# Patient Record
Sex: MALE | Race: WHITE | Employment: OTHER | URBAN - NONMETROPOLITAN AREA
[De-identification: names, ages, dates, MRNs, and addresses within clinical notes are randomized per-mention and may not be internally consistent; named-entity substitution may affect disease eponyms.]

---

## 2019-05-03 RX ORDER — SPIRONOLACTONE 25 MG/1
25 TABLET ORAL DAILY
Status: ON HOLD | COMMUNITY
End: 2022-02-10 | Stop reason: ALTCHOICE

## 2019-05-03 RX ORDER — CLOPIDOGREL BISULFATE 75 MG/1
75 TABLET ORAL DAILY
COMMUNITY

## 2019-05-03 RX ORDER — METOPROLOL TARTRATE 50 MG/1
50 TABLET, FILM COATED ORAL DAILY
COMMUNITY

## 2019-05-03 RX ORDER — ASPIRIN 325 MG
325 TABLET ORAL DAILY
COMMUNITY

## 2019-05-09 ENCOUNTER — HOSPITAL ENCOUNTER (OUTPATIENT)
Dept: INPATIENT UNIT | Age: 74
Discharge: HOME | End: 2019-05-10
Attending: INTERNAL MEDICINE | Admitting: INTERNAL MEDICINE
Payer: MEDICARE

## 2019-05-09 PROBLEM — Z95.820 STATUS POST ANGIOPLASTY WITH STENT: Status: ACTIVE | Noted: 2019-05-09

## 2019-05-09 LAB
ABO: NORMAL
ALBUMIN SERPL-MCNC: 4.6 G/DL (ref 3.5–5.1)
ALP BLD-CCNC: 74 U/L (ref 38–126)
ALT SERPL-CCNC: 16 U/L (ref 11–66)
ANION GAP SERPL CALCULATED.3IONS-SCNC: 12 MEQ/L (ref 8–16)
ANTIBODY SCREEN: NORMAL
AST SERPL-CCNC: 16 U/L (ref 5–40)
BILIRUB SERPL-MCNC: 0.6 MG/DL (ref 0.3–1.2)
BUN BLDV-MCNC: 22 MG/DL (ref 7–22)
CALCIUM SERPL-MCNC: 9.7 MG/DL (ref 8.5–10.5)
CHLORIDE BLD-SCNC: 100 MEQ/L (ref 98–111)
CHOLESTEROL, TOTAL: 125 MG/DL (ref 100–199)
CO2: 26 MEQ/L (ref 23–33)
CREAT SERPL-MCNC: 1.1 MG/DL (ref 0.4–1.2)
EKG ATRIAL RATE: 64 BPM
EKG P AXIS: 65 DEGREES
EKG P-R INTERVAL: 160 MS
EKG Q-T INTERVAL: 408 MS
EKG QRS DURATION: 90 MS
EKG QTC CALCULATION (BAZETT): 420 MS
EKG R AXIS: 35 DEGREES
EKG T AXIS: 54 DEGREES
EKG VENTRICULAR RATE: 64 BPM
ERYTHROCYTE [DISTWIDTH] IN BLOOD BY AUTOMATED COUNT: 12.1 % (ref 11.5–14.5)
ERYTHROCYTE [DISTWIDTH] IN BLOOD BY AUTOMATED COUNT: 38.7 FL (ref 35–45)
GFR SERPL CREATININE-BSD FRML MDRD: 66 ML/MIN/1.73M2
GLUCOSE BLD-MCNC: 169 MG/DL (ref 70–108)
HCT VFR BLD CALC: 40.4 % (ref 42–52)
HDLC SERPL-MCNC: 40 MG/DL
HEMOGLOBIN: 13.9 GM/DL (ref 14–18)
INR BLD: 1.02 (ref 0.85–1.13)
LDL CHOLESTEROL CALCULATED: 67 MG/DL
MCH RBC QN AUTO: 30.6 PG (ref 26–33)
MCHC RBC AUTO-ENTMCNC: 34.4 GM/DL (ref 32.2–35.5)
MCV RBC AUTO: 89 FL (ref 80–94)
PLATELET # BLD: 193 THOU/MM3 (ref 130–400)
PMV BLD AUTO: 11.2 FL (ref 9.4–12.4)
POTASSIUM REFLEX MAGNESIUM: 4.7 MEQ/L (ref 3.5–5.2)
RBC # BLD: 4.54 MILL/MM3 (ref 4.7–6.1)
RH FACTOR: NORMAL
SODIUM BLD-SCNC: 138 MEQ/L (ref 135–145)
TOTAL PROTEIN: 7.3 G/DL (ref 6.1–8)
TRIGL SERPL-MCNC: 88 MG/DL (ref 0–199)
WBC # BLD: 8.4 THOU/MM3 (ref 4.8–10.8)

## 2019-05-09 PROCEDURE — C1894 INTRO/SHEATH, NON-LASER: HCPCS

## 2019-05-09 PROCEDURE — 93005 ELECTROCARDIOGRAM TRACING: CPT | Performed by: INTERNAL MEDICINE

## 2019-05-09 PROCEDURE — 86850 RBC ANTIBODY SCREEN: CPT

## 2019-05-09 PROCEDURE — 80061 LIPID PANEL: CPT

## 2019-05-09 PROCEDURE — C1874 STENT, COATED/COV W/DEL SYS: HCPCS

## 2019-05-09 PROCEDURE — 2500000003 HC RX 250 WO HCPCS

## 2019-05-09 PROCEDURE — 2709999900 HC NON-CHARGEABLE SUPPLY

## 2019-05-09 PROCEDURE — 93458 L HRT ARTERY/VENTRICLE ANGIO: CPT | Performed by: INTERNAL MEDICINE

## 2019-05-09 PROCEDURE — 86900 BLOOD TYPING SEROLOGIC ABO: CPT

## 2019-05-09 PROCEDURE — 85027 COMPLETE CBC AUTOMATED: CPT

## 2019-05-09 PROCEDURE — 2580000003 HC RX 258: Performed by: INTERNAL MEDICINE

## 2019-05-09 PROCEDURE — C1760 CLOSURE DEV, VASC: HCPCS

## 2019-05-09 PROCEDURE — 86901 BLOOD TYPING SEROLOGIC RH(D): CPT

## 2019-05-09 PROCEDURE — 36415 COLL VENOUS BLD VENIPUNCTURE: CPT

## 2019-05-09 PROCEDURE — 93010 ELECTROCARDIOGRAM REPORT: CPT | Performed by: NUCLEAR MEDICINE

## 2019-05-09 PROCEDURE — 6360000002 HC RX W HCPCS

## 2019-05-09 PROCEDURE — 80053 COMPREHEN METABOLIC PANEL: CPT

## 2019-05-09 PROCEDURE — 85610 PROTHROMBIN TIME: CPT

## 2019-05-09 PROCEDURE — 92929 HC PRQ CARD STENT W/ANGIO ADDL: CPT | Performed by: INTERNAL MEDICINE

## 2019-05-09 PROCEDURE — C1769 GUIDE WIRE: HCPCS

## 2019-05-09 PROCEDURE — 6360000004 HC RX CONTRAST MEDICATION: Performed by: INTERNAL MEDICINE

## 2019-05-09 PROCEDURE — C1725 CATH, TRANSLUMIN NON-LASER: HCPCS

## 2019-05-09 PROCEDURE — 92928 PRQ TCAT PLMT NTRAC ST 1 LES: CPT | Performed by: INTERNAL MEDICINE

## 2019-05-09 RX ORDER — SODIUM CHLORIDE 0.9 % (FLUSH) 0.9 %
10 SYRINGE (ML) INJECTION PRN
Status: DISCONTINUED | OUTPATIENT
Start: 2019-05-09 | End: 2019-05-10 | Stop reason: HOSPADM

## 2019-05-09 RX ORDER — OLMESARTAN MEDOXOMIL AND HYDROCHLOROTHIAZIDE 20/12.5 20; 12.5 MG/1; MG/1
1 TABLET ORAL NIGHTLY
Status: DISCONTINUED | OUTPATIENT
Start: 2019-05-09 | End: 2019-05-10 | Stop reason: HOSPADM

## 2019-05-09 RX ORDER — SODIUM CHLORIDE 0.9 % (FLUSH) 0.9 %
10 SYRINGE (ML) INJECTION EVERY 12 HOURS SCHEDULED
Status: DISCONTINUED | OUTPATIENT
Start: 2019-05-09 | End: 2019-05-10 | Stop reason: HOSPADM

## 2019-05-09 RX ORDER — ACETAMINOPHEN 325 MG/1
650 TABLET ORAL EVERY 4 HOURS PRN
Status: DISCONTINUED | OUTPATIENT
Start: 2019-05-09 | End: 2019-05-10 | Stop reason: HOSPADM

## 2019-05-09 RX ORDER — ONDANSETRON 2 MG/ML
4 INJECTION INTRAMUSCULAR; INTRAVENOUS EVERY 6 HOURS PRN
Status: DISCONTINUED | OUTPATIENT
Start: 2019-05-09 | End: 2019-05-10 | Stop reason: HOSPADM

## 2019-05-09 RX ORDER — SODIUM CHLORIDE 0.9 % (FLUSH) 0.9 %
10 SYRINGE (ML) INJECTION EVERY 12 HOURS SCHEDULED
Status: DISCONTINUED | OUTPATIENT
Start: 2019-05-09 | End: 2019-05-09 | Stop reason: SDUPTHER

## 2019-05-09 RX ORDER — CLOPIDOGREL BISULFATE 75 MG/1
75 TABLET ORAL DAILY
Status: DISCONTINUED | OUTPATIENT
Start: 2019-05-10 | End: 2019-05-10 | Stop reason: HOSPADM

## 2019-05-09 RX ORDER — SIMVASTATIN 40 MG
40 TABLET ORAL NIGHTLY
Status: DISCONTINUED | OUTPATIENT
Start: 2019-05-09 | End: 2019-05-10 | Stop reason: HOSPADM

## 2019-05-09 RX ORDER — ASPIRIN 325 MG
325 TABLET ORAL DAILY
Status: DISCONTINUED | OUTPATIENT
Start: 2019-05-10 | End: 2019-05-09 | Stop reason: SDUPTHER

## 2019-05-09 RX ORDER — METOPROLOL TARTRATE 50 MG/1
50 TABLET, FILM COATED ORAL DAILY
Status: DISCONTINUED | OUTPATIENT
Start: 2019-05-09 | End: 2019-05-10 | Stop reason: HOSPADM

## 2019-05-09 RX ORDER — ASPIRIN 325 MG
325 TABLET ORAL DAILY
Status: DISCONTINUED | OUTPATIENT
Start: 2019-05-10 | End: 2019-05-10 | Stop reason: HOSPADM

## 2019-05-09 RX ORDER — DIPHENHYDRAMINE HCL 25 MG
50 TABLET ORAL ONCE
Status: DISCONTINUED | OUTPATIENT
Start: 2019-05-09 | End: 2019-05-10 | Stop reason: HOSPADM

## 2019-05-09 RX ORDER — SODIUM CHLORIDE 0.9 % (FLUSH) 0.9 %
10 SYRINGE (ML) INJECTION PRN
Status: DISCONTINUED | OUTPATIENT
Start: 2019-05-09 | End: 2019-05-09 | Stop reason: SDUPTHER

## 2019-05-09 RX ORDER — ATROPINE SULFATE 0.4 MG/ML
0.5 AMPUL (ML) INJECTION
Status: ACTIVE | OUTPATIENT
Start: 2019-05-09 | End: 2019-05-09

## 2019-05-09 RX ORDER — SODIUM CHLORIDE 9 MG/ML
INJECTION, SOLUTION INTRAVENOUS CONTINUOUS
Status: ACTIVE | OUTPATIENT
Start: 2019-05-09 | End: 2019-05-10

## 2019-05-09 RX ORDER — ASPIRIN 325 MG
325 TABLET ORAL ONCE
Status: DISCONTINUED | OUTPATIENT
Start: 2019-05-09 | End: 2019-05-10 | Stop reason: HOSPADM

## 2019-05-09 RX ORDER — SPIRONOLACTONE 25 MG/1
25 TABLET ORAL DAILY
Status: DISCONTINUED | OUTPATIENT
Start: 2019-05-09 | End: 2019-05-10 | Stop reason: HOSPADM

## 2019-05-09 RX ORDER — SODIUM CHLORIDE 9 MG/ML
INJECTION, SOLUTION INTRAVENOUS CONTINUOUS
Status: DISCONTINUED | OUTPATIENT
Start: 2019-05-09 | End: 2019-05-09 | Stop reason: SDUPTHER

## 2019-05-09 RX ADMIN — SODIUM CHLORIDE: 9 INJECTION, SOLUTION INTRAVENOUS at 19:34

## 2019-05-09 RX ADMIN — SODIUM CHLORIDE: 9 INJECTION, SOLUTION INTRAVENOUS at 13:13

## 2019-05-09 RX ADMIN — Medication 40 MG: at 21:40

## 2019-05-09 RX ADMIN — OLMESARTAN MEDOXOMIL AND HYDROCHLOROTHIAZIDE 20/12.5 1 TABLET: 20; 12.5 TABLET ORAL at 21:39

## 2019-05-09 RX ADMIN — IOPAMIDOL 220 ML: 755 INJECTION, SOLUTION INTRAVENOUS at 17:54

## 2019-05-09 ASSESSMENT — PAIN SCALES - GENERAL
PAINLEVEL_OUTOF10: 1
PAINLEVEL_OUTOF10: 0

## 2019-05-09 ASSESSMENT — PAIN DESCRIPTION - PROGRESSION
CLINICAL_PROGRESSION: GRADUALLY IMPROVING
CLINICAL_PROGRESSION: GRADUALLY IMPROVING

## 2019-05-09 ASSESSMENT — PAIN DESCRIPTION - DIRECTION: RADIATING_TOWARDS: LEG

## 2019-05-09 ASSESSMENT — PAIN DESCRIPTION - LOCATION: LOCATION: GROIN

## 2019-05-09 ASSESSMENT — PAIN DESCRIPTION - PAIN TYPE: TYPE: SURGICAL PAIN

## 2019-05-09 ASSESSMENT — PAIN DESCRIPTION - FREQUENCY: FREQUENCY: INTERMITTENT

## 2019-05-09 ASSESSMENT — PAIN DESCRIPTION - DESCRIPTORS: DESCRIPTORS: ACHING

## 2019-05-09 ASSESSMENT — PAIN - FUNCTIONAL ASSESSMENT: PAIN_FUNCTIONAL_ASSESSMENT: ACTIVITIES ARE NOT PREVENTED

## 2019-05-09 ASSESSMENT — PAIN DESCRIPTION - ORIENTATION: ORIENTATION: RIGHT

## 2019-05-09 ASSESSMENT — PAIN DESCRIPTION - ONSET: ONSET: GRADUAL

## 2019-05-09 NOTE — PROGRESS NOTES
1216 Patient admitted to 2E16  Ambulatory for heart cath. Patient NPO. Patient accompanied by wife. Vital signs obtained. Assessment and data collection intiated. Oriented to room. Policies and procedures for 2E explained. All questions answered with no further questions at this time. Fall prevention and safety precautions discussed with patient. 200 Spoke with patient regarding holding metformin after procedure due to dye reaction, voices understanding, instructed patient additional instructions on discharge. 1630 To cath lab per bed, stable condition. 1805Care taken over from cath lab, right groin stable . Patient reinstructed on bedrest, instructed to keep legs straight, not to cross legs, not to lift head off of pillow, not to laugh hard, if coughs to guard site with hand, voices understanding, taking water without difficulty, menu given to patient. Angiomax IV at 27.7ml per hour, Ancef IVPB infusing. 1830 Ancef and angiomax complete. 1953 Report to Jesse Pimentel on 8b, patient aware of transfer to 8b31, family also informed. 2020 Transferred per bed to 8b31, family aware, belongings with patient.

## 2019-05-09 NOTE — H&P
[facility]   Sedation/Analgesia History and Physical    Pt Name: Eugenio Durant  MRN: [id number]  YOB: 1945  Provider Performing Procedure: Michelle Guan MD  Primary Care Physician: Divina Butcher MD      Pre-Procedure: Chest pain, possible coronary artery disease, abnormal stress test    Consent: I have discussed with the patient and/or the patient representative the indication, alternatives, and the possible risks and/or complications of the planned procedure and the anesthesia methods. The patient and/or representative appear to understand and agree to proceed. Medical History:   has a past medical history of CAD (coronary artery disease), Diabetes mellitus ([address] History of blood transfusion, Hyperlipidemia, Hypertension, and Thyroid disease. .    Surgical History:     has a past surgical history that includes doppler echocardiography (03-21-08); Diagnostic Cardiac Cath Lab Procedure (05-26-06); Coronary angioplasty with stent (2010); and hernia repair. Allergies: Allergies as of 05/09/2019    (No Known Allergies)       Medications:   Coumadin use last 5 days:  No  Antiplatelet drug therapy use last 5 days:  Yes  Other anticoagulant use last 5 days:  No   sodium chloride flush  10 mL Intravenous 2 times per day    diphenhydrAMINE  50 mg Oral Once    hydrocortisone sodium succinate PF  200 mg Intravenous Once    aspirin  325 mg Oral Once     Prior to Admission medications    Medication Sig Start Date End Date Taking?  Authorizing Provider   clopidogrel (PLAVIX) 75 MG tablet Take 75 mg by mouth daily   Yes Historical Provider, MD   spironolactone (ALDACTONE) 25 MG tablet Take 25 mg by mouth daily   Yes Historical Provider, MD   metoprolol tartrate (LOPRESSOR) 50 MG tablet Take 50 mg by mouth daily   Yes Historical Provider, MD   metFORMIN (GLUCOPHAGE) 500 MG tablet Take 500 mg by mouth 2 times daily (with meals)   Yes Historical Provider, MD   aspirin 325 MG tablet

## 2019-05-10 VITALS
DIASTOLIC BLOOD PRESSURE: 56 MMHG | RESPIRATION RATE: 16 BRPM | HEIGHT: 72 IN | SYSTOLIC BLOOD PRESSURE: 99 MMHG | TEMPERATURE: 98.7 F | OXYGEN SATURATION: 96 % | BODY MASS INDEX: 23.7 KG/M2 | HEART RATE: 65 BPM | WEIGHT: 175 LBS

## 2019-05-10 RX ADMIN — METOPROLOL TARTRATE 50 MG: 50 TABLET, FILM COATED ORAL at 10:09

## 2019-05-10 RX ADMIN — Medication 325 MG: at 10:09

## 2019-05-10 RX ADMIN — CLOPIDOGREL BISULFATE 75 MG: 75 TABLET ORAL at 10:09

## 2019-05-10 RX ADMIN — SPIRONOLACTONE 25 MG: 25 TABLET ORAL at 10:08

## 2019-05-10 ASSESSMENT — PAIN SCALES - GENERAL
PAINLEVEL_OUTOF10: 0
PAINLEVEL_OUTOF10: 0

## 2019-05-10 ASSESSMENT — PAIN DESCRIPTION - PROGRESSION
CLINICAL_PROGRESSION: GRADUALLY IMPROVING

## 2019-05-10 NOTE — CARE COORDINATION
CASE MANAGEMENT         5/10/19, 7:46 AM    Kendra Hardwick       Admitted from: cath lab 5/9/2019/ 1158   Location: -31/031-A Reason for admit: Status post angioplasty with stent [Z95.9]   Admit order signed?: no  -  [facility].      Procedure: 05/09/19  Stent om and pdae Percutaneous Coronary Intervention and Left Heart Cath by  [facility].       Pertinent Info/Orders/Treatment Plan:  Telemetry, post cath checks,     PCP: Divina Butcher MD    Discharge Plan: Spoke with Clarita Ricardo; he lives home with his spouse and children. He is independent of all ADL's, declines in-home services, and is current with PCP.        5/10/19, 1:24 PM    Discharge plan discussed by  and . Discharge plan reviewed with patient/ family. Patient/ family verbalize understanding of discharge plan and are in agreement with plan. Understanding was demonstrated using the teach back method.

## 2019-05-10 NOTE — PROGRESS NOTES
Inpatient Cardiac Rehabilitation Consult    Received consult for Phase II Cardiac Rehabilitation. Cardiac Rehab education completed with patient. Sent referral to [facility]. Brochure given.

## 2019-05-10 NOTE — PROGRESS NOTES
Additional imaging guidance using intracardiac echo was performed using a (Catheter Us 8fr 90cm 3d Tip [facility]) catheter. Educated on discharge instructions, medications, post cath restrictions, and follow up appointments. Patient received pamphlet about cardiac intervention, how to take care of the incision site, mended hearts program, cardiac rehab and the hours of operations, and Nutritional information regarding cardiac diet. No further questions or concerns voiced. Discharged to home with family.

## 2019-05-10 NOTE — PLAN OF CARE
Problem: Discharge Planning:  Goal: Discharged to appropriate level of care  Description  Discharged to appropriate level of care  Outcome: Ongoing  Note:   Pt plans to be discharged home tomorrow. Problem: Tissue Perfusion - Peripheral, Altered:  Goal: Absence of hematoma at arterial access site  Description  Absence of hematoma at arterial access site  Outcome: Ongoing  Note:   No s/s of hematoma or bleeding noted to right groin cath site. Dressing remains dry and intact. Problem: Tissue Perfusion - Peripheral, Altered:  Goal: Circulatory function of lower extremities is within specified parameters  Description  Circulatory function of lower extremities is within specified parameters  Outcome: Ongoing  Note:   Radial and pedal pulses strong and present bilaterally. Problem: Pain:  Goal: Pain level will decrease  Description  Pain level will decrease  Outcome: Ongoing  Note:   Pt denies pain at this time. Pain assessed every 4 hours, and as needed. PRN pain medications given as ordered. Problem: Pain:  Goal: Control of acute pain  Description  Control of acute pain  Outcome: Ongoing  Note:   Pain assessed every 4 hours, and as needed. PRN pain medications given as ordered. Problem: Pain:  Goal: Control of chronic pain  Description  Control of chronic pain  Outcome: Ongoing  Note:   Pain assessed every 4 hours, and as needed. PRN pain medications given as ordered. Care plan reviewed with patient. Patient verbalize understanding of the plan of care and contribute to goal setting.

## 2019-05-10 NOTE — PROCEDURES
[address] [place] [address]                            CARDIAC CATHETERIZATION    PATIENT NAME: Tram Strong                   :        1945  MED REC NO:   [id number]                           ROOM:       0031  ACCOUNT NO:   [de-identified]                           ADMIT DATE: 2019  PROVIDER:     JOSHUA Sy Moder:  2019    INDICATION FOR PROCEDURE:  This is a patient, 69years old gentleman  with a history of coronary artery disease, history of prior MI, and  history of prior intervention. This patient has a history of  hypertension, history of dyslipidemia, diabetes mellitus. He has been  complaining of the chest pain and shortness of breath. The patient did  have a stress Cardiolite test, which was abnormal, continued to be  symptomatic, admitted for a heart catheterization and possible  intervention. He had angina pectoris class III. The patient understand  the procedure, the benefits, risks, alternative methods of treatment,  and possible complications. He wants it to be done. PROCEDURE PERFORMED:  1.  IV conscious sedation. The patient was given IV conscious sedation in incremental dosage by the  circulating cath lab RN, monitored by the cath lab monitor tech under my  supervision. The procedure started at 05:00 and finished at 06:00. The  patient has no acute complication from the procedure. 2.  Left heart cath. The right femoral artery cannulated with a  6-Greek sheath. The coronary angiogram showed the dominant artery, a  codominant artery actually with diffuse nonobstructive disease of the  PDA of the RCA. The left main was patent, bifurcates to the LAD and  circumflex. The circumflex is a dominant artery. The distal circumflex  before the origin of the PDA of the circumflex has about 90% stenosis. The obtuse marginal distal to the stented area.   There is about 85%  narrowing. The stented area of the LAD seemed to be patent with diffuse  nonobstructive disease than the stented area. The diagonal artery is still patent. The left ventriculogram showed preserved systolic function of the left  ventricle and ejection fraction about 55%. No mitral regurgitation. No  gradient across the aortic wall. Left ventricle end diastolic pressure  was 9.    3. Intervention of the circumflex. Left main cannulated with a 6 x 3.5  left Voda guide catheter. The patient given was given the Angiomax  bolus. Angiomax drip was started. The patient has been on aspirin and  Plavix. A BMW wire was placed at the distal end of the PDA of the circumflex  with attempted primary stenting; however, the stent were not passed. We  have to predilate the distal circumflex artery utilizing a 3.0 x 12 mm  balloon. This was placed in the area of maximum stenosis and placed for  15 seconds at 12 atmospheric pressure. The balloon was then removed and  we utilized a 3.0 x 22 mm Resolute drug-eluting stent. This was placed  in the area of the maximum stenosis covering the area from normal to  normal.  This was deployed at 16 atmospheric pressure and it was then  postdilated utilizing a 3.5 x 20 mm noncompliant balloon increasing the  effective size of the stent to 3.6 to 5 mm, reducing the stenosis to a  0% JUSTIN-3 flow. The wire was then removed and was placed into the obtuse marginal.  The  obtuse marginal was then stented utilizing a 2.75 x 12 mm Resolute  drug-eluting stent. This was deployed at 12 atmospheric pressure for 25  seconds. The balloon was the removed and the stent was then dilated  utilizing a 3.0 x 15 mm noncompliant balloon, which was inflated up to  16 atmospheric pressure increasing the effective size of the stent to  almost 3.1 mm reducing the stenosis to 0% JUSTIN-3 flow.   The angiogram of  the right iliac artery showed patent right iliac artery with a good  distal runoff. Successful deployment of the Angio-Seal closure device. IMPRESSION:  1. Preserved systolic function of the left ventricle. Ejection  fraction was about 55%. No mitral regurgitation. No gradient across  the aortic valve. 2.  Codominant RCA with diffuse nonobstructive disease of the PDA of the  RCA. 3.  Patent left main. 4.  Subtotal stenosis, distal circumflex before the origin of large PDA  of the circumflex. 5.  Severe stenosis of the mid obtuse margin of the circumflex. 6.  The stented area of the LAD and diagonal artery still patent with  nonobstructive disease. 7.  Successful angioplasty stent deployment to distal circumflex and the  obtuse marginals as mentioned above, reducing the stenosis to a 0%  JUSTIN-3 flow. 8.  Successful deployment of the Angio-Seal closure device. RECOMMENDATIONS:  At this point, we recommend aggressive medical  treatment, risk factor modification, periodic care followup,  dual-antiplatelet treatment for minimum of one year, cardiac rehab.         Martita Arango M.D.    D: 05/10/2019 7:06:57       T: 05/10/2019 8:30:51     [email or web address]  Job#: [id number]     Doc#: [id number]    CC:  Referring Service

## 2019-05-11 NOTE — DISCHARGE SUMMARY
[id number]  Discharge  Summary dictated
2:38:27     [email or web address]  Job#: [id number]     Doc#: [id number]    CC:  Gabe Villagomez M.D.

## 2020-09-16 LAB
IVSD (M-MODE): 0.9 CM
LVIDD (M-MODE): 4.7 CM
RVIDD M-MODE: 2.9 CM

## 2021-08-24 LAB
EKG P AXIS: NORMAL DEG
EKG P-R INTERVAL: NORMAL MS
EKG QRS INTERVAL: 92 MS
EKG QTC INTERVAL: NORMAL MS
HEART RATE: 62 BPM

## 2022-02-10 ENCOUNTER — HOSPITAL ENCOUNTER (OUTPATIENT)
Dept: INPATIENT UNIT | Age: 77
Discharge: HOME | End: 2022-02-11
Attending: INTERNAL MEDICINE | Admitting: INTERNAL MEDICINE
Payer: MEDICARE

## 2022-02-10 LAB
ABO: NORMAL
ACTIVATED CLOTTING TIME: 237 SECONDS (ref 1–150)
ACTIVATED CLOTTING TIME: 237 SECONDS (ref 1–150)
ACTIVATED CLOTTING TIME: 291 SECONDS (ref 1–150)
ALBUMIN SERPL-MCNC: 4.8 G/DL (ref 3.5–5.1)
ALP BLD-CCNC: 83 U/L (ref 38–126)
ALT SERPL-CCNC: 15 U/L (ref 11–66)
ANION GAP SERPL CALCULATED.3IONS-SCNC: 11 MEQ/L (ref 8–16)
ANTIBODY SCREEN: NORMAL
AST SERPL-CCNC: 13 U/L (ref 5–40)
BILIRUB SERPL-MCNC: 0.8 MG/DL (ref 0.3–1.2)
BUN BLDV-MCNC: 20 MG/DL (ref 7–22)
CALCIUM SERPL-MCNC: 9.5 MG/DL (ref 8.5–10.5)
CHLORIDE BLD-SCNC: 104 MEQ/L (ref 98–111)
CHOLESTEROL, TOTAL: 126 MG/DL (ref 100–199)
CO2: 28 MEQ/L (ref 23–33)
CREAT SERPL-MCNC: 0.9 MG/DL (ref 0.4–1.2)
EKG ATRIAL RATE: 61 BPM
EKG P AXIS: 66 DEGREES
EKG P-R INTERVAL: 154 MS
EKG Q-T INTERVAL: 424 MS
EKG QRS DURATION: 90 MS
EKG QTC CALCULATION (BAZETT): 426 MS
EKG R AXIS: 32 DEGREES
EKG T AXIS: 67 DEGREES
EKG VENTRICULAR RATE: 61 BPM
ERYTHROCYTE [DISTWIDTH] IN BLOOD BY AUTOMATED COUNT: 12 % (ref 11.5–14.5)
ERYTHROCYTE [DISTWIDTH] IN BLOOD BY AUTOMATED COUNT: 39.3 FL (ref 35–45)
GFR SERPL CREATININE-BSD FRML MDRD: 82 ML/MIN/1.73M2
GLUCOSE BLD-MCNC: 165 MG/DL (ref 70–108)
HCT VFR BLD CALC: 46.9 % (ref 42–52)
HDLC SERPL-MCNC: 43 MG/DL
HEMOGLOBIN: 15.9 GM/DL (ref 14–18)
INR BLD: 1.06 (ref 0.85–1.13)
LDL CHOLESTEROL CALCULATED: 64 MG/DL
MCH RBC QN AUTO: 30.5 PG (ref 26–33)
MCHC RBC AUTO-ENTMCNC: 33.9 GM/DL (ref 32.2–35.5)
MCV RBC AUTO: 89.8 FL (ref 80–94)
PLATELET # BLD: 164 THOU/MM3 (ref 130–400)
PMV BLD AUTO: 11.3 FL (ref 9.4–12.4)
POTASSIUM REFLEX MAGNESIUM: 3.9 MEQ/L (ref 3.5–5.2)
RBC # BLD: 5.22 MILL/MM3 (ref 4.7–6.1)
RH FACTOR: NORMAL
SODIUM BLD-SCNC: 143 MEQ/L (ref 135–145)
TOTAL PROTEIN: 6.9 G/DL (ref 6.1–8)
TRIGL SERPL-MCNC: 93 MG/DL (ref 0–199)
WBC # BLD: 9.6 THOU/MM3 (ref 4.8–10.8)

## 2022-02-10 PROCEDURE — C1894 INTRO/SHEATH, NON-LASER: HCPCS

## 2022-02-10 PROCEDURE — 85027 COMPLETE CBC AUTOMATED: CPT

## 2022-02-10 PROCEDURE — 93010 ELECTROCARDIOGRAM REPORT: CPT | Performed by: NUCLEAR MEDICINE

## 2022-02-10 PROCEDURE — C1887 CATHETER, GUIDING: HCPCS

## 2022-02-10 PROCEDURE — 80061 LIPID PANEL: CPT

## 2022-02-10 PROCEDURE — 86900 BLOOD TYPING SEROLOGIC ABO: CPT

## 2022-02-10 PROCEDURE — 6360000002 HC RX W HCPCS

## 2022-02-10 PROCEDURE — C1725 CATH, TRANSLUMIN NON-LASER: HCPCS

## 2022-02-10 PROCEDURE — 6360000004 HC RX CONTRAST MEDICATION: Performed by: INTERNAL MEDICINE

## 2022-02-10 PROCEDURE — 36415 COLL VENOUS BLD VENIPUNCTURE: CPT

## 2022-02-10 PROCEDURE — 86850 RBC ANTIBODY SCREEN: CPT

## 2022-02-10 PROCEDURE — 2580000003 HC RX 258: Performed by: INTERNAL MEDICINE

## 2022-02-10 PROCEDURE — C9600 PERC DRUG-EL COR STENT SING: HCPCS

## 2022-02-10 PROCEDURE — 86901 BLOOD TYPING SEROLOGIC RH(D): CPT

## 2022-02-10 PROCEDURE — 93458 L HRT ARTERY/VENTRICLE ANGIO: CPT

## 2022-02-10 PROCEDURE — C1769 GUIDE WIRE: HCPCS

## 2022-02-10 PROCEDURE — 2500000003 HC RX 250 WO HCPCS

## 2022-02-10 PROCEDURE — 85347 COAGULATION TIME ACTIVATED: CPT

## 2022-02-10 PROCEDURE — C1874 STENT, COATED/COV W/DEL SYS: HCPCS

## 2022-02-10 PROCEDURE — 80053 COMPREHEN METABOLIC PANEL: CPT

## 2022-02-10 PROCEDURE — 93005 ELECTROCARDIOGRAM TRACING: CPT | Performed by: INTERNAL MEDICINE

## 2022-02-10 PROCEDURE — 85610 PROTHROMBIN TIME: CPT

## 2022-02-10 RX ORDER — EMPAGLIFLOZIN 25 MG/1
25 TABLET, FILM COATED ORAL DAILY
COMMUNITY

## 2022-02-10 RX ORDER — ONDANSETRON 2 MG/ML
4 INJECTION INTRAMUSCULAR; INTRAVENOUS EVERY 6 HOURS PRN
Status: DISCONTINUED | OUTPATIENT
Start: 2022-02-10 | End: 2022-02-11 | Stop reason: HOSPADM

## 2022-02-10 RX ORDER — ACETAMINOPHEN 160 MG
2000 TABLET,DISINTEGRATING ORAL DAILY
Status: DISCONTINUED | OUTPATIENT
Start: 2022-02-10 | End: 2022-02-11 | Stop reason: HOSPADM

## 2022-02-10 RX ORDER — ASPIRIN 325 MG
325 TABLET ORAL DAILY
Status: DISCONTINUED | OUTPATIENT
Start: 2022-02-11 | End: 2022-02-10 | Stop reason: SDUPTHER

## 2022-02-10 RX ORDER — ACETAMINOPHEN 325 MG/1
650 TABLET ORAL EVERY 4 HOURS PRN
Status: DISCONTINUED | OUTPATIENT
Start: 2022-02-10 | End: 2022-02-11 | Stop reason: HOSPADM

## 2022-02-10 RX ORDER — ACETAMINOPHEN 160 MG
TABLET,DISINTEGRATING ORAL
COMMUNITY

## 2022-02-10 RX ORDER — ISOSORBIDE MONONITRATE 60 MG/1
60 TABLET, EXTENDED RELEASE ORAL DAILY
COMMUNITY

## 2022-02-10 RX ORDER — ATROPINE SULFATE 0.4 MG/ML
0.5 AMPUL (ML) INJECTION
Status: ACTIVE | OUTPATIENT
Start: 2022-02-10 | End: 2022-02-10

## 2022-02-10 RX ORDER — SODIUM CHLORIDE 9 MG/ML
INJECTION, SOLUTION INTRAVENOUS CONTINUOUS
Status: ACTIVE | OUTPATIENT
Start: 2022-02-10 | End: 2022-02-10

## 2022-02-10 RX ORDER — ASPIRIN 325 MG
325 TABLET ORAL ONCE
Status: COMPLETED | OUTPATIENT
Start: 2022-02-10 | End: 2022-02-11

## 2022-02-10 RX ORDER — DIPHENHYDRAMINE HCL 25 MG
50 TABLET ORAL ONCE
Status: DISCONTINUED | OUTPATIENT
Start: 2022-02-10 | End: 2022-02-11 | Stop reason: HOSPADM

## 2022-02-10 RX ORDER — OLMESARTAN MEDOXOMIL AND HYDROCHLOROTHIAZIDE 20/12.5 20; 12.5 MG/1; MG/1
1 TABLET ORAL NIGHTLY
Status: DISCONTINUED | OUTPATIENT
Start: 2022-02-10 | End: 2022-02-11 | Stop reason: HOSPADM

## 2022-02-10 RX ORDER — ASPIRIN 325 MG
325 TABLET ORAL DAILY
Status: DISCONTINUED | OUTPATIENT
Start: 2022-02-11 | End: 2022-02-11 | Stop reason: HOSPADM

## 2022-02-10 RX ORDER — LEVOTHYROXINE SODIUM 0.1 MG/1
100 TABLET ORAL DAILY
Status: DISCONTINUED | OUTPATIENT
Start: 2022-02-10 | End: 2022-02-11 | Stop reason: HOSPADM

## 2022-02-10 RX ORDER — SIMVASTATIN 40 MG
40 TABLET ORAL NIGHTLY
Status: DISCONTINUED | OUTPATIENT
Start: 2022-02-10 | End: 2022-02-11 | Stop reason: HOSPADM

## 2022-02-10 RX ORDER — SODIUM CHLORIDE 9 MG/ML
25 INJECTION, SOLUTION INTRAVENOUS PRN
Status: DISCONTINUED | OUTPATIENT
Start: 2022-02-10 | End: 2022-02-11 | Stop reason: HOSPADM

## 2022-02-10 RX ORDER — LEVOTHYROXINE SODIUM 0.1 MG/1
100 TABLET ORAL DAILY
COMMUNITY

## 2022-02-10 RX ORDER — SODIUM CHLORIDE 0.9 % (FLUSH) 0.9 %
5-40 SYRINGE (ML) INJECTION EVERY 12 HOURS SCHEDULED
Status: DISCONTINUED | OUTPATIENT
Start: 2022-02-10 | End: 2022-02-11 | Stop reason: HOSPADM

## 2022-02-10 RX ORDER — SODIUM CHLORIDE 9 MG/ML
INJECTION, SOLUTION INTRAVENOUS CONTINUOUS
Status: DISCONTINUED | OUTPATIENT
Start: 2022-02-10 | End: 2022-02-10 | Stop reason: SDUPTHER

## 2022-02-10 RX ORDER — SODIUM CHLORIDE 0.9 % (FLUSH) 0.9 %
5-40 SYRINGE (ML) INJECTION PRN
Status: DISCONTINUED | OUTPATIENT
Start: 2022-02-10 | End: 2022-02-11 | Stop reason: HOSPADM

## 2022-02-10 RX ORDER — CLOPIDOGREL BISULFATE 75 MG/1
75 TABLET ORAL DAILY
Status: DISCONTINUED | OUTPATIENT
Start: 2022-02-10 | End: 2022-02-11 | Stop reason: HOSPADM

## 2022-02-10 RX ORDER — ISOSORBIDE MONONITRATE 60 MG/1
60 TABLET, EXTENDED RELEASE ORAL DAILY
Status: DISCONTINUED | OUTPATIENT
Start: 2022-02-10 | End: 2022-02-11 | Stop reason: HOSPADM

## 2022-02-10 RX ADMIN — IOPAMIDOL 240 ML: 755 INJECTION, SOLUTION INTRAVENOUS at 14:04

## 2022-02-10 RX ADMIN — OLMESARTAN MEDOXOMIL AND HYDROCHLOROTHIAZIDE 20/12.5 1 TABLET: 20; 12.5 TABLET ORAL at 21:06

## 2022-02-10 RX ADMIN — Medication 2000 UNITS: at 21:07

## 2022-02-10 RX ADMIN — SODIUM CHLORIDE, PRESERVATIVE FREE 10 ML: 5 INJECTION INTRAVENOUS at 21:07

## 2022-02-10 RX ADMIN — Medication 40 MG: at 21:06

## 2022-02-10 RX ADMIN — SODIUM CHLORIDE: 9 INJECTION, SOLUTION INTRAVENOUS at 11:06

## 2022-02-10 NOTE — H&P
[address]   Sedation/Analgesia History and Physical    Pt Name: Carmen Galeazzi  MRN: [id number]  YOB: 1945  Provider Performing Procedure: Alexey Prince MD, MD  Primary Care Physician: Naty Puentes MD      Pre-Procedure: Chest pain, possible coronary artery disease, abnormal stress test    Consent: I have discussed with the patient and/or the patient representative the indication, alternatives, and the possible risks and/or complications of the planned procedure and the anesthesia methods. The patient and/or representative appear to understand and agree to proceed. Medical History:   has a past medical history of CAD (coronary artery disease), Diabetes mellitus ([address] History of blood transfusion, Hyperlipidemia, Hypertension, and Thyroid disease. .    Surgical History:     has a past surgical history that includes doppler echocardiography (03-21-08); Diagnostic Cardiac Cath Lab Procedure (05-26-06); Coronary angioplasty with stent (2010); and hernia repair. Allergies: Allergies as of 02/10/2022    (No Known Allergies)       Medications:   Coumadin use last 5 days:  No  Antiplatelet drug therapy use last 5 days:  Yes  Other anticoagulant use last 5 days:  No   aspirin  325 mg Oral Once    diphenhydrAMINE  50 mg Oral Once    hydrocortisone sodium succinate PF  200 mg IntraVENous Once     Prior to Admission medications    Medication Sig Start Date End Date Taking?  Authorizing Provider   isosorbide mononitrate (IMDUR) 60 MG extended release tablet Take 60 mg by mouth daily   Yes Historical Provider, MD   empagliflozin (JARDIANCE) 25 MG tablet Take 25 mg by mouth daily   Yes Historical Provider, MD   Cholecalciferol (VITAMIN D3) 50 MCG (2000 UT) CAPS Take by mouth   Yes Historical Provider, MD   levothyroxine (SYNTHROID) 100 MCG tablet Take 100 mcg by mouth Daily   Yes Historical Provider, MD   clopidogrel (PLAVIX) 75 MG tablet Take 75 mg by mouth daily   Yes Historical Provider, MD   metoprolol tartrate (LOPRESSOR) 50 MG tablet Take 75 mg by mouth daily    Yes Historical Provider, MD   metFORMIN (GLUCOPHAGE) 500 MG tablet Take 500 mg by mouth 2 times daily (with meals)   Yes Historical Provider, MD   aspirin 325 MG tablet Take 325 mg by mouth daily   Yes Historical Provider, MD   olmesartan-hydrochlorothiazide (BENICAR HCT) 20-12.5 MG per tablet Take 1 tablet by mouth nightly    Yes Historical Provider, MD   simvastatin (ZOCOR) 40 MG tablet Take 40 mg by mouth nightly. Yes Historical Provider, MD       Vital Signs  Vitals:    02/10/22 1030   BP: (!) 141/67   Pulse: 62   Resp: 15   Temp: 98.5 °F (36.9 °C)   SpO2: 98%       Physical:  Heart:  regular rate and rhythm  Lungs:  Clear  Abdomen:  Soft  Mental Status:  Alert and Oriented    Planned Procedure:  Left Heart Cath and Possible Percutaneous Coronary Intervention    Sedation/ Anesthesia Plan: Midazolam and Sublimaze    ASA Classification: Class 3 - A patient with severe systemic disease that limits activity but is not incapacitating    Mallampati Airway Classification: II (soft palate, uvula, fauces visible)    · Pre-procedure diagnostic studies complete and results available. · Previous sedation/anesthesia experiences assessed. · The patient is an appropriate candidate to undergo the planned procedure sedation and anesthesia. (Refer to nursing sedation/analgesia documentation record)  · Formulation and discussion of sedation/procedure plan, risks, and expectations with patient and/or responsible adult completed. · Patient examined immediately prior to the procedure.  (Refer to nursing sedation/analgesia documentation record)    Luda Driver MD, MD  Electronically signed 2/10/2022 at 12:53 PM  Patient Name: Steffi Elliott Record Number: [id number]  Date: 2/10/2022   Time: 12:53 PM   Room/Bed: [facility]10/010-A

## 2022-02-10 NOTE — OP NOTE
Operative Note          MRN: [facility]  Sedation/Analgesia Post Sedation Record      Pt Name: Shante Hatch  MRN: [id number]  YOB: 1945  Procedure Performed By: Danilo Edwards MD, MD  Primary Care Physician: Paul Koo MD    POST-PROCEDURE    Physician: Danilo Edwards MD, MD    Procedure Performed:  Left Heart Cath    Sedation/Anesthesia:  Local Anesthesia and IV Conscious Sedation with continuous O2 monitoring    Estimated Blood Loss:  Minimal    Specimens Removed:  None    Complications:  None     Post Procedure Diagnosis/Findings:  Coronary Artery Disease    Recommendations:  Medical treatment and review films. Danilo Edwards MD, MD  Electronically signed 2/10/2022 at 2:16 PM                                     [facility]  Sedation/Analgesia Post Sedation Record      Pt Name: Shante Hatch  MRN: [id number]  YOB: 1945  Procedure Performed By: Danilo Edwards MD, MD  Primary Care Physician: Paul Koo MD    POST-PROCEDURE    Physician: Danilo Edwards MD, MD    Procedure Performed:  Left Heart Cathstent lad    Sedation/Anesthesia:  Local Anesthesia and IV Conscious Sedation with continuous O2 monitoring    Estimated Blood Loss:  Minimal    Specimens Removed:  None    Complications:  None     Post Procedure Diagnosis/Findings:  Coronary Artery Disease    Recommendations:  Medical treatment and review films.        Danilo Edwards MD, MD  Electronically signed 2/10/2022 at 2:16 PM                                     s: *No surgery found *  s

## 2022-02-10 NOTE — PROCEDURES
[address] [id number]                            CARDIAC CATHETERIZATION    PATIENT NAME: Seferino Peterson                   :        1945  MED REC NO:   [id number]                           ROOM:       0010  ACCOUNT NO:   [de-identified]                           ADMIT DATE: 02/10/2022  PROVIDER:     Don[facility] Felty. [facility] Drafts[facility]  02/10/2022    INDICATION FOR PROCEDURE:  This is a patient who is a 72-year-old  patient who had a history of coronary artery disease, prior  intervention. The patient has diabetes mellitus. The patient has  angina pectoris class III. He had highly abnormal nuclear stress test,  referred for a heart cath and possible intervention. He understands the  procedure, benefits, risks, alternative methods of treatment, possible  complications, and agrees to have it done. 1.  Procedure started at 01:00 p.m. and finished at 02:00 p.m. No acute  complication from the procedure. Estimated blood loss about 15 mL. 2.  Left heart cath:  Right radial artery cannulated with a 6-Armenian  sheath. Coronary angiogram showed the RCA is a codominant artery. Nonobstructive disease of the RCA was noticed. Total occlusion of the  PLB of the RCA. Chronic occlusion. Left main was short, patent; bifurcates to the LAD and circumflex. Stented areas of the circumflex and the OM are still patent. Circumflex  is dominant artery. There is total occlusion of the mid LAD and below  the diagonal artery. Faint left-to-left collaterals were seen. Right-to-left collaterals also seen. The left ventriculogram showed anterior hypokinesia and ejection  fraction about 50%. No mitral regurg. No gradient across the aortic  valve. 3.  Intervention of the LAD:  Left main cannulated with a 6 x 3.5 left  Voda guide catheter. A Fielder wire was utilized. The patient was  given heparin bolus.   ACT was therapeutic. He has been already on  aspirin and Plavix. The wire was manipulated with the help of push-it balloon until we are  able to cross the area of the total occlusion. Multiple inflations were made inside the stent and the area was totally  chronically occluded. We are able to establish flow into the LAD. This  was then removed. A 2.5 x 15 mm noncompliant balloon was also utilized  and was placed in the area of the total occlusion. Multiple inflations  were made. This was removed and then we used a 3.0 x 15 mm cutting  balloon and multiple inflations with the area of the total occlusion  inside the stents. The cutting balloon was then removed. We utilized a 3.0 x 26 mm  Resolute drug-eluting stent was placed in the mid LAD in the area of the  total occlusion, deployed at 15 atmospheric pressure for 25 seconds,  reducing the stenosis to 0%. The mid LAD lesion proximal to the stented  area was then dilated and re-stented utilized a 3.5 x 20 mm Resolute  drug-eluting stent. It was hard to pass the stent through the area of  the severe stenosis. A GuideLiner was utilized. The stent was deployed at 15 atmospheric  pressure. There was reduction of stenosis to a 0%, JUSTIN-3 flow. IMPRESSION:  1. Anterior wall hypokinesia and ejection fraction about 50%. No  mitral regurg. No gradient across the aortic valve. 2.  Nonobstructive disease of the RCA. PLB was totally occluded. Left  collaterals were seen coming from the RCA to the septal   branch. 3.  Patent left main. 4.  Patent circumflex, dominant artery. 5.  Total occlusion of the mid LAD, chronic occlusion. 6.  Angioplasty cutting balloon and stenting of the LAD reducing  stenosis from 100% to 0%, JUSTIN-3 flow. RECOMMENDATIONS:  At this point, continue medical treatment, risk factor  modification, periodic followup, dual antiplatelet treatments, and  cardiac rehab.         Aline Rojas M.D.    D: 02/10/2022 14:25:15 T: 02/10/2022 16:21:53     [email or web address]  Job#: [id number]     Doc#: [id number]    CC:  Gabe Guevara M.D.

## 2022-02-11 VITALS
RESPIRATION RATE: 16 BRPM | OXYGEN SATURATION: 96 % | SYSTOLIC BLOOD PRESSURE: 131 MMHG | BODY MASS INDEX: 22.24 KG/M2 | HEART RATE: 81 BPM | WEIGHT: 164.2 LBS | HEIGHT: 72 IN | TEMPERATURE: 97.7 F | DIASTOLIC BLOOD PRESSURE: 66 MMHG

## 2022-02-11 LAB
ERYTHROCYTE [DISTWIDTH] IN BLOOD BY AUTOMATED COUNT: 12.1 % (ref 11.5–14.5)
ERYTHROCYTE [DISTWIDTH] IN BLOOD BY AUTOMATED COUNT: 39.6 FL (ref 35–45)
HCT VFR BLD CALC: 41.9 % (ref 42–52)
HEMOGLOBIN: 14.1 GM/DL (ref 14–18)
MCH RBC QN AUTO: 30.4 PG (ref 26–33)
MCHC RBC AUTO-ENTMCNC: 33.7 GM/DL (ref 32.2–35.5)
MCV RBC AUTO: 90.3 FL (ref 80–94)
PLATELET # BLD: 124 THOU/MM3 (ref 130–400)
PMV BLD AUTO: 11.3 FL (ref 9.4–12.4)
RBC # BLD: 4.64 MILL/MM3 (ref 4.7–6.1)
WBC # BLD: 8 THOU/MM3 (ref 4.8–10.8)

## 2022-02-11 PROCEDURE — 6370000000 HC RX 637 (ALT 250 FOR IP): Performed by: INTERNAL MEDICINE

## 2022-02-11 PROCEDURE — 36415 COLL VENOUS BLD VENIPUNCTURE: CPT

## 2022-02-11 PROCEDURE — 85027 COMPLETE CBC AUTOMATED: CPT

## 2022-02-11 RX ADMIN — LEVOTHYROXINE SODIUM 100 MCG: 0.1 TABLET ORAL at 08:27

## 2022-02-11 RX ADMIN — Medication 325 MG: at 08:24

## 2022-02-11 RX ADMIN — CLOPIDOGREL BISULFATE 75 MG: 75 TABLET ORAL at 08:24

## 2022-02-11 RX ADMIN — ASPIRIN 325 MG: 325 TABLET ORAL at 08:22

## 2022-02-11 RX ADMIN — ISOSORBIDE MONONITRATE 60 MG: 60 TABLET, EXTENDED RELEASE ORAL at 08:27

## 2022-02-11 ASSESSMENT — PAIN SCALES - GENERAL
PAINLEVEL_OUTOF10: 0
PAINLEVEL_OUTOF10: 0

## 2022-02-11 NOTE — DISCHARGE SUMMARY
[address] [place] [address]                               DISCHARGE SUMMARY    PATIENT NAME: Khsuhi Forte                   :        1945  MED REC NO:   [id number]                           ROOM:       0019  ACCOUNT NO:   [de-identified]                           ADMIT DATE: 02/10/2022  PROVIDER:     Daniel Nicole. JOSHUA Tinoco Covert: 2022    FINAL DIAGNOSES:  1. Crescendo angina pectoris. 2.  Atherosclerotic coronary artery disease of native coronary arteries. 3.  History of hypertension. 4.  History of hypercholesterolemia. 5.  Diabetes mellitus. 6.  Abnormal nuclear stress test.    BRIEF HISTORY:  A 82-year-old gentleman with history of coronary artery  disease, history of prior intervention, complaining of the chest pain,  had a stress Cardiolite test which was abnormal.  The patient was  treated medically, continued to be symptomatic; admitted for a heart  cath and intervention. He understands the procedure, the benefits, the  risks, the alternative methods of treatment, possible complications, and  agrees to have it done. HOSPITALIZATION COURSE:  The patient was taken to the cath lab _____  heart cath was performed showed total occlusion of the LAD. He  underwent intervention of the LAD for chronically occluded LAD. The  patient has reduction of stenosis to 0%. Two stents were placed. The  patient tolerated the procedure well, monitored overnight, continued to  be stable. He was discharged home in a stable condition. He will be  seen in the office and he is to seek medical attention if he has any  change in clinical condition, to be involved in a cardiac rehab and  dual-antiplatelet treatments.         Bryan Latham M.D.    D: 2022 7:33:40       T: 2022 8:48:14     [email or web address]  Job#: [id number]     Doc#: [id number]    CC:

## 2022-02-11 NOTE — PROGRESS NOTES
Inpatient Cardiac Rehabilitation Consult    Received consult for Phase II Cardiac Rehabilitation. Patient needs cardiac rehab due to PCI on 2/10/22. Importance of Cardiac Rehab discussed with patient. Reviewed cardiac rehab class times. Patient questions answered. We will contact patient at home to schedule evaluation appointment. Patient authorized for information to be sent to [facility] cardiac rehab. Cardiac Rehab brochure given.

## 2022-04-26 VITALS
RESPIRATION RATE: 16 BRPM | HEART RATE: 81 BPM | TEMPERATURE: 97.7 F | OXYGEN SATURATION: 96 % | HEIGHT: 72 IN | BODY MASS INDEX: 22.21 KG/M2 | SYSTOLIC BLOOD PRESSURE: 131 MMHG | WEIGHT: 164 LBS | DIASTOLIC BLOOD PRESSURE: 61 MMHG

## 2022-04-26 PROBLEM — E89.0 S/P PARTIAL THYROIDECTOMY: Status: ACTIVE | Noted: 2020-03-10

## 2022-04-26 PROBLEM — E11.9 TYPE 2 DIABETES MELLITUS (HCC): Status: ACTIVE | Noted: 2020-03-09

## 2022-04-26 PROBLEM — G89.18 POSTOPERATIVE PAIN: Status: ACTIVE | Noted: 2020-03-11

## 2022-04-26 PROBLEM — R49.0 DYSPHONIA: Status: ACTIVE | Noted: 2020-03-09

## 2022-04-26 PROBLEM — D49.7 FOLLICULAR NEOPLASM OF THYROID: Status: ACTIVE | Noted: 2020-03-09

## 2022-04-26 PROBLEM — R22.1 NECK MASS: Status: ACTIVE | Noted: 2020-03-09

## 2022-10-10 ENCOUNTER — OFFICE VISIT (OUTPATIENT)
Dept: CARDIOLOGY CLINIC | Age: 77
End: 2022-10-10
Payer: MEDICARE

## 2022-10-10 VITALS
DIASTOLIC BLOOD PRESSURE: 72 MMHG | HEIGHT: 72 IN | BODY MASS INDEX: 22.02 KG/M2 | HEART RATE: 51 BPM | RESPIRATION RATE: 18 BRPM | SYSTOLIC BLOOD PRESSURE: 130 MMHG | WEIGHT: 162.6 LBS

## 2022-10-10 DIAGNOSIS — E78.5 DYSLIPIDEMIA (HIGH LDL; LOW HDL): ICD-10-CM

## 2022-10-10 DIAGNOSIS — I10 HYPERTENSION, UNSPECIFIED TYPE: Primary | ICD-10-CM

## 2022-10-10 PROCEDURE — 93000 ELECTROCARDIOGRAM COMPLETE: CPT | Performed by: INTERNAL MEDICINE

## 2022-10-10 PROCEDURE — 99214 OFFICE O/P EST MOD 30 MIN: CPT | Performed by: INTERNAL MEDICINE

## 2022-10-10 PROCEDURE — 1123F ACP DISCUSS/DSCN MKR DOCD: CPT | Performed by: INTERNAL MEDICINE

## 2022-10-10 RX ORDER — AMLODIPINE BESYLATE 5 MG/1
5 TABLET ORAL DAILY
COMMUNITY
End: 2022-10-10 | Stop reason: SDUPTHER

## 2022-10-10 RX ORDER — AMLODIPINE BESYLATE 5 MG/1
5 TABLET ORAL DAILY
Qty: 90 TABLET | Refills: 3 | Status: SHIPPED | OUTPATIENT
Start: 2022-10-10

## 2022-10-10 ASSESSMENT — ENCOUNTER SYMPTOMS
NAUSEA: 0
ABDOMINAL DISTENTION: 0
COUGH: 0
WHEEZING: 0
TROUBLE SWALLOWING: 0
SHORTNESS OF BREATH: 0
APNEA: 0
COLOR CHANGE: 0
ANAL BLEEDING: 0
BLOOD IN STOOL: 0
CHEST TIGHTNESS: 0
STRIDOR: 0
VOMITING: 0
CHOKING: 0
ABDOMINAL PAIN: 0
VOICE CHANGE: 0

## 2022-10-10 NOTE — PROGRESS NOTES
[address]  Dept: [address]  Loc: [phone]     10/10/2022       Brooklyn Hardwick is here today for   Chief Complaint   Patient presents with    Follow-up           Referring Physician:  No ref. provider found     Patient Active Problem List   Diagnosis    CAD (coronary artery disease)    Hyperlipidemia    Hypertension    Status post angioplasty with stent    Type 2 diabetes mellitus (HCC)    Neck mass    S/P partial thyroidectomy    Postoperative pain    Follicular neoplasm of thyroid    Dysphonia       Review of Systems   Constitutional:  Negative for activity change, appetite change, fatigue, fever and unexpected weight change. HENT:  Negative for congestion, trouble swallowing and voice change. Eyes:  Negative for visual disturbance. Respiratory:  Negative for apnea, cough, choking, chest tightness, shortness of breath, wheezing and stridor. Cardiovascular:  Negative for chest pain, palpitations and leg swelling. Gastrointestinal:  Negative for abdominal distention, abdominal pain, anal bleeding, blood in stool, nausea and vomiting. Endocrine: Negative for cold intolerance and heat intolerance. Genitourinary:  Negative for hematuria. Musculoskeletal:  Negative for arthralgias, gait problem, joint swelling and myalgias. Skin:  Negative for color change and rash. Allergic/Immunologic: Negative for environmental allergies and food allergies. Neurological:  Negative for dizziness, tremors, syncope, facial asymmetry, weakness, light-headedness, numbness and headaches. Hematological:  Does not bruise/bleed easily. Psychiatric/Behavioral:  Negative for agitation, behavioral problems and sleep disturbance.        Past Medical History:   Diagnosis Date    CAD (coronary artery disease)     Diabetes mellitus ([facility] [address]     History of blood transfusion     trauma Hyperlipidemia     Hypertension     Thyroid disease     had ultrasound has nodule       No Known Allergies    Current Outpatient Medications   Medication Sig Dispense Refill    amLODIPine (NORVASC) 5 MG tablet Take 1 tablet by mouth daily 90 tablet 3    MULTIPLE VITAMINS-MINERALS PO Take by mouth as needed      isosorbide mononitrate (IMDUR) 60 MG extended release tablet Take 60 mg by mouth daily      empagliflozin (JARDIANCE) 25 MG tablet Take 25 mg by mouth daily      Cholecalciferol (VITAMIN D3) 50 MCG (2000 UT) CAPS Take by mouth      levothyroxine (SYNTHROID) 100 MCG tablet Take 100 mcg by mouth Daily      clopidogrel (PLAVIX) 75 MG tablet Take 75 mg by mouth daily      metoprolol tartrate (LOPRESSOR) 50 MG tablet Take 50 mg by mouth daily TAKE ONE  AND ONE-HALF TABLET BY MOUTH ONCE DAILY      metFORMIN (GLUCOPHAGE) 500 MG tablet Take 500 mg by mouth 2 times daily (with meals)      aspirin 325 MG tablet Take 325 mg by mouth daily      olmesartan-hydroCHLOROthiazide (BENICAR HCT) 20-12.5 MG per tablet Take 1 tablet by mouth nightly       simvastatin (ZOCOR) 40 MG tablet Take 40 mg by mouth nightly. No current facility-administered medications for this visit. Social History     Socioeconomic History    Marital status:      Spouse name: None    Number of children: None    Years of education: None    Highest education level: None   Tobacco Use    Smoking status: Never    Smokeless tobacco: Never   Substance and Sexual Activity    Alcohol use: Yes     Comment: rare    Drug use: Never    Sexual activity: Yes     Partners: Female       Family History   Problem Relation Age of Onset    Diabetes Mother     Stroke Mother     Heart Disease Father         angina    Diabetes Brother     Cancer Neg Hx     High Blood Pressure Neg Hx        Blood pressure 130/72, pulse 51, resp. rate 18, height 6' (1.829 m), weight 162 lb 9.6 oz (73.8 kg).     Physical Exam:    General Appearance: alert and oriented to person, place and time, in no acute distress  Cardiovascular: normal rate, regular rhythm, normal S1 and S2, no murmurs, rubs, clicks, or gallops, distal pulses intact, no carotid bruits, no JVD  Pulmonary/Chest: clear to auscultation bilaterally- no wheezes, rales or rhonchi, normal air movement, no respiratory distress  Abdomen: soft, non-tender, non-distended, normal bowel sounds, no masses   Extremities: no cyanosis, clubbing or edema, pulse   Skin: warm and dry, no rash or erythema  Head: normocephalic and atraumatic  Eyes: pupils equal, round, and reactive to light  Neck: supple and non-tender without mass, no thyromegaly   Musculoskeletal: normal range of motion, no joint swelling, deformity or tenderness  Neurological: alert, oriented, normal speech, no focal findings or movement disorder noted    Lab Data:    Cardiac Enzymes:  No results for input(s): CKTOTAL, CKMB, CKMBINDEX, TROPONINI in the last 72 hours.     CBC:   Lab Results   Component Value Date/Time    WBC 8.0 02/11/2022 06:09 AM    RBC 4.64 02/11/2022 06:09 AM    HGB 14.1 02/11/2022 06:09 AM    HCT 41.9 02/11/2022 06:09 AM     02/11/2022 06:09 AM       CMP:    Lab Results   Component Value Date/Time     02/10/2022 10:30 AM    K 3.9 02/10/2022 10:30 AM     02/10/2022 10:30 AM    CO2 28 02/10/2022 10:30 AM    BUN 20 02/10/2022 10:30 AM    CREATININE 0.9 02/10/2022 10:30 AM    LABGLOM 82 02/10/2022 10:30 AM    GLUCOSE 165 02/10/2022 10:30 AM    CALCIUM 9.5 02/10/2022 10:30 AM       Hepatic Function Panel:    Lab Results   Component Value Date/Time    ALKPHOS 83 02/10/2022 10:30 AM    ALT 15 02/10/2022 10:30 AM    AST 13 02/10/2022 10:30 AM    PROT 6.9 02/10/2022 10:30 AM    BILITOT 0.8 02/10/2022 10:30 AM    LABALBU 4.8 02/10/2022 10:30 AM       Magnesium:  No results found for: MG    PT/INR:    Lab Results   Component Value Date/Time    INR 1.06 02/10/2022 10:30 AM       HgBA1c:  No results found for: LABA1C    FLP:    Lab Results Component Value Date/Time    TRIG 93 02/10/2022 10:30 AM    HDL 43 02/10/2022 10:30 AM    LDLCALC 64 02/10/2022 10:30 AM       TSH:  No results found for: TSH     Diagnosis Orders   1. Hypertension, unspecified type  24731 - [place] ELECTROCARDIOGRAM, COMPLETE    CBC    Basic Metabolic Panel    Lipid Panel    Hepatic Function Panel      2. Dyslipidemia (high LDL; low HDL)  CBC    Basic Metabolic Panel    Lipid Panel    Hepatic Function Panel           Assessment/Plan    Is a 78years old gentleman who is known to have a history of coronary artery disease has a history of prior intervention of the LAD he is here for a 6 months follow-up he indicates he is feeling well he denied chest pain. He does have history of hypertension has been on the Benicar and has been under control. Patient has no significant change in his weight    He has history of diabetes mellitus he has been on the metformin and Jardiance. This patient also has a history hypercholesterolemia has been on the simvastatin.     With the patient EKG today showed sinus rhythm sinus bradycardia no acute changes the patient lab work the will discussed with him medication were reconciled and sent to his pharmacy the plan of treatment discussed with him and with his wife from the cardiac standpoint patient seems to be stable continue monitoring seen annually and seek medical attention if had any change clinical condition thank    Orders Placed This Encounter   Procedures    CBC     Standing Status:   Future     Standing Expiration Date:   18/12/4883    Basic Metabolic Panel     Standing Status:   Future     Standing Expiration Date:   10/10/2023    Lipid Panel     Standing Status:   Future     Standing Expiration Date:   10/10/2023     Order Specific Question:   Is Patient Fasting?/# of Hours     Answer:   12 hours    Hepatic Function Panel     Standing Status:   Future     Standing Expiration Date:   10/10/2023    33530 - [place] ELECTROCARDIOGRAM, COMPLETE Return in about 1 year (around 10/10/2023) for cad.      Bijal Daniels MD

## 2023-10-09 ENCOUNTER — NURSE ONLY (OUTPATIENT)
Dept: LAB | Age: 78
End: 2023-10-09

## 2023-10-09 DIAGNOSIS — I10 HYPERTENSION, UNSPECIFIED TYPE: ICD-10-CM

## 2023-10-09 DIAGNOSIS — E78.5 DYSLIPIDEMIA (HIGH LDL; LOW HDL): ICD-10-CM

## 2023-10-09 LAB
ALBUMIN SERPL BCG-MCNC: 4.6 G/DL (ref 3.5–5.1)
ALP SERPL-CCNC: 81 U/L (ref 38–126)
ALT SERPL W/O P-5'-P-CCNC: 13 U/L (ref 11–66)
ANION GAP SERPL CALC-SCNC: 13 MEQ/L (ref 8–16)
AST SERPL-CCNC: 15 U/L (ref 5–40)
BILIRUB CONJ SERPL-MCNC: < 0.2 MG/DL (ref 0–0.3)
BILIRUB SERPL-MCNC: 0.9 MG/DL (ref 0.3–1.2)
BUN SERPL-MCNC: 20 MG/DL (ref 7–22)
CALCIUM SERPL-MCNC: 9.7 MG/DL (ref 8.5–10.5)
CHLORIDE SERPL-SCNC: 101 MEQ/L (ref 98–111)
CHOLEST SERPL-MCNC: 131 MG/DL (ref 100–199)
CO2 SERPL-SCNC: 27 MEQ/L (ref 23–33)
CREAT SERPL-MCNC: 0.9 MG/DL (ref 0.4–1.2)
DEPRECATED RDW RBC AUTO: 39.8 FL (ref 35–45)
ERYTHROCYTE [DISTWIDTH] IN BLOOD BY AUTOMATED COUNT: 11.9 % (ref 11.5–14.5)
GFR SERPL CREATININE-BSD FRML MDRD: > 60 ML/MIN/1.73M2
GLUCOSE SERPL-MCNC: 138 MG/DL (ref 70–108)
HCT VFR BLD AUTO: 46.7 % (ref 42–52)
HDLC SERPL-MCNC: 42 MG/DL
HGB BLD-MCNC: 15.3 GM/DL (ref 14–18)
LDLC SERPL CALC-MCNC: 74 MG/DL
MCH RBC QN AUTO: 30 PG (ref 26–33)
MCHC RBC AUTO-ENTMCNC: 32.8 GM/DL (ref 32.2–35.5)
MCV RBC AUTO: 91.6 FL (ref 80–94)
PLATELET # BLD AUTO: 166 THOU/MM3 (ref 130–400)
PMV BLD AUTO: 11.1 FL (ref 9.4–12.4)
POTASSIUM SERPL-SCNC: 4.2 MEQ/L (ref 3.5–5.2)
PROT SERPL-MCNC: 6.8 G/DL (ref 6.1–8)
RBC # BLD AUTO: 5.1 MILL/MM3 (ref 4.7–6.1)
SODIUM SERPL-SCNC: 141 MEQ/L (ref 135–145)
TRIGL SERPL-MCNC: 77 MG/DL (ref 0–199)
WBC # BLD AUTO: 7.3 THOU/MM3 (ref 4.8–10.8)

## 2023-10-16 ENCOUNTER — OFFICE VISIT (OUTPATIENT)
Dept: CARDIOLOGY CLINIC | Age: 78
End: 2023-10-16
Payer: MEDICARE

## 2023-10-16 VITALS
RESPIRATION RATE: 18 BRPM | SYSTOLIC BLOOD PRESSURE: 135 MMHG | BODY MASS INDEX: 22.21 KG/M2 | HEIGHT: 72 IN | DIASTOLIC BLOOD PRESSURE: 63 MMHG | WEIGHT: 164 LBS | HEART RATE: 61 BPM

## 2023-10-16 DIAGNOSIS — I25.10 CORONARY ARTERY DISEASE INVOLVING NATIVE CORONARY ARTERY OF NATIVE HEART WITHOUT ANGINA PECTORIS: Primary | ICD-10-CM

## 2023-10-16 DIAGNOSIS — E78.5 HYPERLIPIDEMIA LDL GOAL <70: ICD-10-CM

## 2023-10-16 DIAGNOSIS — I10 PRIMARY HYPERTENSION: ICD-10-CM

## 2023-10-16 PROCEDURE — 3074F SYST BP LT 130 MM HG: CPT | Performed by: NURSE PRACTITIONER

## 2023-10-16 PROCEDURE — 3078F DIAST BP <80 MM HG: CPT | Performed by: NURSE PRACTITIONER

## 2023-10-16 PROCEDURE — 99214 OFFICE O/P EST MOD 30 MIN: CPT | Performed by: NURSE PRACTITIONER

## 2023-10-16 PROCEDURE — 93000 ELECTROCARDIOGRAM COMPLETE: CPT | Performed by: INTERNAL MEDICINE

## 2023-10-16 PROCEDURE — 1123F ACP DISCUSS/DSCN MKR DOCD: CPT | Performed by: NURSE PRACTITIONER

## 2023-10-16 RX ORDER — AMLODIPINE BESYLATE 2.5 MG/1
2.5 TABLET ORAL DAILY
COMMUNITY

## 2023-10-16 NOTE — PROGRESS NOTES
[address] [facility] [address]  Dept: [address]: [phone]           Chief Complaint   Patient presents with    Follow-up       Cardiologist:  Dr. Tc Bryant      Today's visit: 10/16/2023    Subjective:    Review of Systems   Constitutional: Negative. Respiratory: Negative. Cardiovascular: Negative. Gastrointestinal: Negative. Musculoskeletal: Negative. Neurological: Negative. Psychiatric/Behavioral: Negative. Past Medical History:   Diagnosis Date    CAD (coronary artery disease)     Diabetes mellitus ([address])     History of blood transfusion     trauma    Hyperlipidemia     Hypertension     Thyroid disease     had ultrasound has nodule       Allergies   Allergen Reactions    Adhesive Tape        Current Outpatient Medications   Medication Sig Dispense Refill    amLODIPine (NORVASC) 2.5 MG tablet Take 1 tablet by mouth daily      isosorbide mononitrate (IMDUR) 60 MG extended release tablet Take 1 tablet by mouth daily      empagliflozin (JARDIANCE) 25 MG tablet Take 1 tablet by mouth daily      Cholecalciferol (VITAMIN D3) 50 MCG (2000 UT) CAPS Take by mouth      levothyroxine (SYNTHROID) 100 MCG tablet Take 1 tablet by mouth Daily      clopidogrel (PLAVIX) 75 MG tablet Take 1 tablet by mouth daily      metoprolol tartrate (LOPRESSOR) 50 MG tablet Take 1 tablet by mouth daily TAKE ONE  AND ONE-HALF TABLET BY MOUTH ONCE DAILY      metFORMIN (GLUCOPHAGE) 500 MG tablet Take 1 tablet by mouth 2 times daily (with meals)      aspirin 325 MG tablet Take 1 tablet by mouth daily      olmesartan-hydroCHLOROthiazide (BENICAR HCT) 20-12.5 MG per tablet Take 1 tablet by mouth nightly      simvastatin (ZOCOR) 40 MG tablet Take 1 tablet by mouth nightly       No current facility-administered medications for this visit.        Social History     Socioeconomic History    Marital status:

## 2023-10-17 ASSESSMENT — ENCOUNTER SYMPTOMS
GASTROINTESTINAL NEGATIVE: 1
RESPIRATORY NEGATIVE: 1

## 2024-03-04 ENCOUNTER — TELEPHONE (OUTPATIENT)
Dept: CARDIOLOGY CLINIC | Age: 79
End: 2024-03-04

## 2024-05-23 NOTE — PROGRESS NOTES
[facility]  [facility]  [address] [facility] [address]  Dept: [phone]  Dept Fax: [phone]  Loc: [phone]    Visit Date: 5/24/2024    Mr. Hardwick is a 79 y.o. male  who presented for:  Establish cardiac care  Coronary artery disease  HPI:   HPI   Zelalem Hardwick is a pleasant 79 year old male patient who  has a past medical history of CAD (coronary artery disease), Diabetes mellitus (HCC), History of blood transfusion, Hyperlipidemia, Hypertension, and Thyroid disease. He used to follow with Dr Rodriguez, wishes to establish cardiac care. Patient has h/o CAD. He had PCI of LAD in 2019. On 2/2022, patient underwent PCI/DENIS for LAD . He was also noted  of PLB (collateralized). EF 50% on LHC. The patient is compliant with his medications. He reports h/o \"leakage of a valve\" that there was a plan to follow up on. Patient denies chest pain, shortness of breath. Has mild dyspnea on exertion. No leg swelling.       Current Outpatient Medications:     amLODIPine (NORVASC) 2.5 MG tablet, Take 1 tablet by mouth daily, Disp: , Rfl:     isosorbide mononitrate (IMDUR) 60 MG extended release tablet, Take 1 tablet by mouth daily, Disp: , Rfl:     empagliflozin (JARDIANCE) 25 MG tablet, Take 1 tablet by mouth daily, Disp: , Rfl:     Cholecalciferol (VITAMIN D3) 50 MCG (2000 UT) CAPS, Take by mouth, Disp: , Rfl:     levothyroxine (SYNTHROID) 100 MCG tablet, Take 1 tablet by mouth Daily, Disp: , Rfl:     clopidogrel (PLAVIX) 75 MG tablet, Take 1 tablet by mouth daily, Disp: , Rfl:     metoprolol tartrate (LOPRESSOR) 50 MG tablet, Take 1 tablet by mouth daily TAKE ONE  AND ONE-HALF TABLET BY MOUTH ONCE DAILY, Disp: , Rfl:     metFORMIN (GLUCOPHAGE) 500 MG tablet, Take 1 tablet by mouth 2 times daily (with meals), Disp: , Rfl:     aspirin 325 MG tablet, Take 1 tablet by mouth daily, Disp: , Rfl:     olmesartan-hydroCHLOROthiazide (BENICAR HCT) 20-12.5 MG per tablet,

## 2024-05-24 ENCOUNTER — HOSPITAL ENCOUNTER (OUTPATIENT)
Age: 79
End: 2024-05-24
Attending: INTERNAL MEDICINE
Payer: MEDICARE

## 2024-05-24 ENCOUNTER — TELEPHONE (OUTPATIENT)
Dept: CARDIOLOGY CLINIC | Age: 79
End: 2024-05-24

## 2024-05-24 ENCOUNTER — OFFICE VISIT (OUTPATIENT)
Dept: CARDIOLOGY CLINIC | Age: 79
End: 2024-05-24

## 2024-05-24 VITALS
DIASTOLIC BLOOD PRESSURE: 65 MMHG | HEART RATE: 54 BPM | HEIGHT: 72 IN | BODY MASS INDEX: 21.78 KG/M2 | SYSTOLIC BLOOD PRESSURE: 139 MMHG | WEIGHT: 160.8 LBS

## 2024-05-24 VITALS — DIASTOLIC BLOOD PRESSURE: 65 MMHG | SYSTOLIC BLOOD PRESSURE: 139 MMHG

## 2024-05-24 DIAGNOSIS — R01.1 HEART MURMUR: ICD-10-CM

## 2024-05-24 DIAGNOSIS — R01.1 HEART MURMUR: Primary | ICD-10-CM

## 2024-05-24 LAB
ECHO AO ASC DIAM: 3.3 CM
ECHO AO ASCENDING AORTA INDEX: 1.7 CM/M2
ECHO AV CUSP MM: 2.1 CM
ECHO AV PEAK GRADIENT: 8 MMHG
ECHO AV PEAK VELOCITY: 1.4 M/S
ECHO AV VELOCITY RATIO: 0.79
ECHO BSA: 1.92 M2
ECHO EST RA PRESSURE: 5 MMHG
ECHO IVC PROX: 1.6 CM
ECHO LA AREA 2C: 16 CM2
ECHO LA AREA 4C: 14.1 CM2
ECHO LA DIAMETER INDEX: 1.55 CM/M2
ECHO LA DIAMETER: 3 CM
ECHO LA MAJOR AXIS: 4.9 CM
ECHO LA MINOR AXIS: 5 CM
ECHO LA VOL BP: 38 ML (ref 18–58)
ECHO LA VOL MOD A2C: 43 ML (ref 18–58)
ECHO LA VOL MOD A4C: 33 ML (ref 18–58)
ECHO LA VOL/BSA BIPLANE: 20 ML/M2 (ref 16–34)
ECHO LA VOLUME INDEX MOD A2C: 22 ML/M2 (ref 16–34)
ECHO LA VOLUME INDEX MOD A4C: 17 ML/M2 (ref 16–34)
ECHO LV E' LATERAL VELOCITY: 9 CM/S
ECHO LV E' SEPTAL VELOCITY: 8 CM/S
ECHO LV FRACTIONAL SHORTENING: 35 % (ref 28–44)
ECHO LV INTERNAL DIMENSION DIASTOLE INDEX: 2.47 CM/M2
ECHO LV INTERNAL DIMENSION DIASTOLIC: 4.8 CM (ref 4.2–5.9)
ECHO LV INTERNAL DIMENSION SYSTOLIC INDEX: 1.6 CM/M2
ECHO LV INTERNAL DIMENSION SYSTOLIC: 3.1 CM
ECHO LV ISOVOLUMETRIC RELAXATION TIME (IVRT): 35 MS
ECHO LV IVSD: 1 CM (ref 0.6–1)
ECHO LV MASS 2D: 170.2 G (ref 88–224)
ECHO LV MASS INDEX 2D: 87.7 G/M2 (ref 49–115)
ECHO LV POSTERIOR WALL DIASTOLIC: 1 CM (ref 0.6–1)
ECHO LV RELATIVE WALL THICKNESS RATIO: 0.42
ECHO LVOT PEAK GRADIENT: 4 MMHG
ECHO LVOT PEAK VELOCITY: 1.1 M/S
ECHO MV A VELOCITY: 0.94 M/S
ECHO MV E DECELERATION TIME (DT): 327 MS
ECHO MV E VELOCITY: 0.69 M/S
ECHO MV E/A RATIO: 0.73
ECHO MV E/E' LATERAL: 7.67
ECHO MV E/E' RATIO (AVERAGED): 8.15
ECHO MV E/E' SEPTAL: 8.63
ECHO PV MAX VELOCITY: 0.8 M/S
ECHO PV PEAK GRADIENT: 3 MMHG
ECHO RIGHT VENTRICULAR SYSTOLIC PRESSURE (RVSP): 26 MMHG
ECHO RV INTERNAL DIMENSION: 2.9 CM
ECHO RV TAPSE: 2.9 CM (ref 1.7–?)
ECHO TV E WAVE: 0.6 M/S
ECHO TV REGURGITANT MAX VELOCITY: 2.29 M/S
ECHO TV REGURGITANT PEAK GRADIENT: 21 MMHG

## 2024-05-24 PROCEDURE — 93306 TTE W/DOPPLER COMPLETE: CPT

## 2024-05-24 PROCEDURE — 93306 TTE W/DOPPLER COMPLETE: CPT | Performed by: INTERNAL MEDICINE

## 2024-05-24 RX ORDER — DOXYCYCLINE HYCLATE 100 MG
100 TABLET ORAL DAILY
COMMUNITY
Start: 2024-05-23

## 2024-05-24 RX ORDER — ROSUVASTATIN CALCIUM 20 MG/1
20 TABLET, COATED ORAL DAILY
Qty: 30 TABLET | Refills: 3 | Status: SHIPPED | OUTPATIENT
Start: 2024-05-24

## 2024-05-24 RX ORDER — ASPIRIN 81 MG/1
81 TABLET ORAL DAILY
Qty: 90 TABLET | Refills: 0 | Status: SHIPPED | OUTPATIENT
Start: 2024-05-24

## 2024-05-24 NOTE — PROGRESS NOTES
Pt C/O No cardiac symptoms at this time.      Pt denies CP, SOB, Headache, dizziness, heart palpitations, swelling, fatigue

## 2024-05-28 ENCOUNTER — TELEPHONE (OUTPATIENT)
Dept: CARDIOLOGY CLINIC | Age: 79
End: 2024-05-28

## 2025-05-02 ENCOUNTER — OFFICE VISIT (OUTPATIENT)
Dept: CARDIOLOGY CLINIC | Age: 80
End: 2025-05-02
Payer: MEDICARE

## 2025-05-02 VITALS
DIASTOLIC BLOOD PRESSURE: 62 MMHG | HEART RATE: 48 BPM | SYSTOLIC BLOOD PRESSURE: 126 MMHG | BODY MASS INDEX: 21.81 KG/M2 | WEIGHT: 161 LBS | HEIGHT: 72 IN

## 2025-05-02 DIAGNOSIS — I25.10 CORONARY ARTERY DISEASE INVOLVING NATIVE CORONARY ARTERY OF NATIVE HEART WITHOUT ANGINA PECTORIS: Primary | ICD-10-CM

## 2025-05-02 PROCEDURE — 3078F DIAST BP <80 MM HG: CPT | Performed by: INTERNAL MEDICINE

## 2025-05-02 PROCEDURE — 1123F ACP DISCUSS/DSCN MKR DOCD: CPT | Performed by: INTERNAL MEDICINE

## 2025-05-02 PROCEDURE — 93000 ELECTROCARDIOGRAM COMPLETE: CPT | Performed by: INTERNAL MEDICINE

## 2025-05-02 PROCEDURE — 99214 OFFICE O/P EST MOD 30 MIN: CPT | Performed by: INTERNAL MEDICINE

## 2025-05-02 PROCEDURE — 3074F SYST BP LT 130 MM HG: CPT | Performed by: INTERNAL MEDICINE

## 2025-05-02 PROCEDURE — 1159F MED LIST DOCD IN RCRD: CPT | Performed by: INTERNAL MEDICINE

## 2025-05-02 RX ORDER — METOPROLOL TARTRATE 25 MG/1
12.5 TABLET, FILM COATED ORAL 2 TIMES DAILY
Qty: 30 TABLET | Refills: 3 | Status: SHIPPED | OUTPATIENT
Start: 2025-05-02

## 2025-05-02 NOTE — PROGRESS NOTES
[facility] [place] [facility]  [facility]  [address] [facility] [address]  Dept: [phone]  Dept Fax: [phone]  Loc: [phone]    Visit Date: 5/2/2025  Mr. Hardwick is a 79 y.o. male who presented for:  Coronary artery disease  HPI:   Zelalem Hardwick is a pleasant 79 y.o. male who  has a past medical history of CAD (coronary artery disease), Diabetes mellitus (HCC), History of blood transfusion, Hyperlipidemia, Hypertension, and Thyroid disease. He used to follow with Dr Rodriguez, wishes to establish cardiac care. Patient has h/o CAD. He had PCI of LAD in 2019. On 2/2022, patient underwent PCI/DENIS for LAD . He was also noted  of PLB (collateralized). EF 50% on LHC. Echocardiogram on 5/2024 revealed an EF of 60-65%. Patient denies chest pain, shortness of breath, dyspnea on exertion. No palpitations, dizziness, syncope, leg swelling or weight gain.       Current Outpatient Medications:     doxycycline hyclate (VIBRA-TABS) 100 MG tablet, 1 tablet daily, Disp: , Rfl:     aspirin 81 MG EC tablet, Take 1 tablet by mouth daily, Disp: 90 tablet, Rfl: 0    rosuvastatin (CRESTOR) 20 MG tablet, Take 1 tablet by mouth daily, Disp: 30 tablet, Rfl: 3    amLODIPine (NORVASC) 2.5 MG tablet, Take 1 tablet by mouth daily, Disp: , Rfl:     isosorbide mononitrate (IMDUR) 60 MG extended release tablet, Take 1 tablet by mouth daily, Disp: , Rfl:     empagliflozin (JARDIANCE) 25 MG tablet, Take 1 tablet by mouth daily, Disp: , Rfl:     Cholecalciferol (VITAMIN D3) 50 MCG (2000 UT) CAPS, Take by mouth, Disp: , Rfl:     levothyroxine (SYNTHROID) 100 MCG tablet, Take 1 tablet by mouth Daily, Disp: , Rfl:     clopidogrel (PLAVIX) 75 MG tablet, Take 1 tablet by mouth daily, Disp: , Rfl:     metoprolol tartrate (LOPRESSOR) 50 MG tablet, Take 1 tablet by mouth daily TAKE ONE  AND ONE-HALF TABLET BY MOUTH ONCE DAILY, Disp: , Rfl:     metFORMIN (GLUCOPHAGE) 500 MG tablet, Take 1 tablet by mouth 2

## 2025-05-02 NOTE — PROGRESS NOTES
Pt here for 1 yr fu     Last EKG Done today    Pt denies any cardiac concerns at this time    Medication list, pharmacy and allergies verified.

## 2025-05-07 LAB
ALBUMIN: 4.6 G/DL
ALP BLD-CCNC: 79 U/L
ALT SERPL-CCNC: 15 U/L
ANION GAP SERPL CALCULATED.3IONS-SCNC: 13 MMOL/L
AST SERPL-CCNC: 19 U/L
BASOPHILS ABSOLUTE: NORMAL
BASOPHILS RELATIVE PERCENT: NORMAL
BILIRUB SERPL-MCNC: 0.9 MG/DL (ref 0.1–1.4)
BUN BLDV-MCNC: 21 MG/DL
CALCIUM SERPL-MCNC: 9.9 MG/DL
CHLORIDE BLD-SCNC: 102 MMOL/L
CHOLESTEROL, TOTAL: 123 MG/DL
CHOLESTEROL/HDL RATIO: NORMAL
CO2: 26 MMOL/L
CREAT SERPL-MCNC: 1.1 MG/DL
EOSINOPHILS ABSOLUTE: NORMAL
EOSINOPHILS RELATIVE PERCENT: NORMAL
GFR, ESTIMATED: 64
GLUCOSE BLD-MCNC: 130 MG/DL
HCT VFR BLD CALC: 44.6 % (ref 41–53)
HDLC SERPL-MCNC: 45 MG/DL (ref 35–70)
HEMOGLOBIN: 14.7 G/DL (ref 13.5–17.5)
LDL CHOLESTEROL: 56
LYMPHOCYTES ABSOLUTE: NORMAL
LYMPHOCYTES RELATIVE PERCENT: NORMAL
MCH RBC QN AUTO: NORMAL PG
MCHC RBC AUTO-ENTMCNC: NORMAL G/DL
MCV RBC AUTO: NORMAL FL
MONOCYTES ABSOLUTE: NORMAL
MONOCYTES RELATIVE PERCENT: NORMAL
NEUTROPHILS ABSOLUTE: NORMAL
NEUTROPHILS RELATIVE PERCENT: NORMAL
NONHDLC SERPL-MCNC: NORMAL MG/DL
PLATELET # BLD: 159 K/ΜL
PMV BLD AUTO: NORMAL FL
POTASSIUM SERPL-SCNC: 3.7 MMOL/L
RBC # BLD: 5.04 10^6/ΜL
SODIUM BLD-SCNC: 141 MMOL/L
TOTAL PROTEIN: 6.9 G/DL (ref 6.4–8.2)
TRIGL SERPL-MCNC: 110 MG/DL
VLDLC SERPL CALC-MCNC: 22 MG/DL
WBC # BLD: 7.9 10^3/ML

## 2025-05-08 ENCOUNTER — RESULTS FOLLOW-UP (OUTPATIENT)
Dept: CARDIOLOGY CLINIC | Age: 80
End: 2025-05-08

## 2025-05-09 ENCOUNTER — TELEPHONE (OUTPATIENT)
Dept: CARDIOLOGY CLINIC | Age: 80
End: 2025-05-09

## 2025-05-09 NOTE — TELEPHONE ENCOUNTER
Result note for CMP    Baljit Richardson RN  JS    5/8/25  2:13 PM  Note      LM for patient to call our office back.         Kera Ho MD to [facility] Clinical [facility] (Selected Message)      5/8/25  8:06 AM  Result Note  Labs are within normal range Inform patient  CBC; Comprehensive Metabolic Panel; Lipid Panel